# Patient Record
Sex: FEMALE | Race: WHITE | URBAN - METROPOLITAN AREA
[De-identification: names, ages, dates, MRNs, and addresses within clinical notes are randomized per-mention and may not be internally consistent; named-entity substitution may affect disease eponyms.]

---

## 2022-07-21 ENCOUNTER — OFFICE VISIT (OUTPATIENT)
Dept: URBAN - METROPOLITAN AREA CLINIC 19 | Facility: CLINIC | Age: 72
End: 2022-07-21

## 2022-07-22 ENCOUNTER — WEB ENCOUNTER (OUTPATIENT)
Dept: URBAN - METROPOLITAN AREA CLINIC 19 | Facility: CLINIC | Age: 72
End: 2022-07-22

## 2022-07-28 ENCOUNTER — OFFICE VISIT (OUTPATIENT)
Dept: URBAN - METROPOLITAN AREA CLINIC 19 | Facility: CLINIC | Age: 72
End: 2022-07-28
Payer: COMMERCIAL

## 2022-07-28 ENCOUNTER — LAB OUTSIDE AN ENCOUNTER (OUTPATIENT)
Dept: URBAN - METROPOLITAN AREA CLINIC 19 | Facility: CLINIC | Age: 72
End: 2022-07-28

## 2022-07-28 VITALS
OXYGEN SATURATION: 100 % | BODY MASS INDEX: 25.92 KG/M2 | WEIGHT: 155.6 LBS | HEIGHT: 65 IN | SYSTOLIC BLOOD PRESSURE: 121 MMHG | DIASTOLIC BLOOD PRESSURE: 70 MMHG | TEMPERATURE: 98.8 F | HEART RATE: 76 BPM

## 2022-07-28 DIAGNOSIS — R19.7 DIARRHEA OF PRESUMED INFECTIOUS ORIGIN: ICD-10-CM

## 2022-07-28 DIAGNOSIS — R19.4 CHANGE IN BOWEL HABIT: ICD-10-CM

## 2022-07-28 DIAGNOSIS — K57.31 DIVERTICULOSIS LARGE INTESTINE W/O PERFORATION OR ABSCESS W/BLEEDING: ICD-10-CM

## 2022-07-28 PROCEDURE — 99204 OFFICE O/P NEW MOD 45 MIN: CPT | Performed by: STUDENT IN AN ORGANIZED HEALTH CARE EDUCATION/TRAINING PROGRAM

## 2022-07-28 RX ORDER — LEVOTHYROXINE SODIUM 112 UG/1
1 TABLET IN THE MORNING ON AN EMPTY STOMACH TABLET ORAL ONCE A DAY
Status: ACTIVE | COMMUNITY

## 2022-07-28 RX ORDER — LOSARTAN POTASSIUM AND HYDROCHLOROTHIAZIDE 50; 12.5 MG/1; MG/1
1 TABLET TABLET, FILM COATED ORAL ONCE A DAY
Status: ACTIVE | COMMUNITY

## 2022-07-28 NOTE — HPI-TODAY'S VISIT:
The pt is a 73 yo F who presents for diarrhea that was a change in stool habits since May.  Loose stools with multiple bms and fecal incontinence and urgency.  Blood work normal.  Planned stool studies but never done by PCP.  Triggerd by foods she has eaten.  Last colonoscopy was in 2011 by Dr. Lizzie Bazzi with a 3 mm descending colon.  She also had diverticulosis.  Had her thyroid doses increased due to being underdose recently. Very leary of invasive procedures after her  had a complication following surgery which resulted in kidney failure and receiving 18 units of blood transfusion.  She is currently his care provider and has been in contact with [employer].  Not on OAC/APT.  Not on supplements.  No sick contacts.  No travel.  No known FH of crc or colon polyps  Mom had diverticulosis.  No melena or bleeding.

## 2022-08-03 LAB — CLOSTRIDIUM DIFFICILE TOXINB,QL REAL TIME PCR: NOT DETECTED

## 2022-08-26 ENCOUNTER — CLAIMS CREATED FROM THE CLAIM WINDOW (OUTPATIENT)
Dept: URBAN - METROPOLITAN AREA CLINIC 4 | Facility: CLINIC | Age: 72
End: 2022-08-26
Payer: COMMERCIAL

## 2022-08-26 ENCOUNTER — OFFICE VISIT (OUTPATIENT)
Dept: URBAN - METROPOLITAN AREA SURGERY CENTER 31 | Facility: SURGERY CENTER | Age: 72
End: 2022-08-26
Payer: COMMERCIAL

## 2022-08-26 DIAGNOSIS — R19.4 ALTERATION IN BOWEL ELIMINATION: ICD-10-CM

## 2022-08-26 DIAGNOSIS — K62.89 HYPERTROPHY, ANAL PAPILLAE: ICD-10-CM

## 2022-08-26 DIAGNOSIS — K63.89 OTHER SPECIFIED DISEASES OF INTESTINE: ICD-10-CM

## 2022-08-26 DIAGNOSIS — K57.31 DIVERTICULOSIS LARGE INTESTINE W/O PERFORATION OR ABSCESS W/BLEEDING: ICD-10-CM

## 2022-08-26 PROCEDURE — 88305 TISSUE EXAM BY PATHOLOGIST: CPT | Performed by: PATHOLOGY

## 2022-08-26 PROCEDURE — G8907 PT DOC NO EVENTS ON DISCHARG: HCPCS | Performed by: STUDENT IN AN ORGANIZED HEALTH CARE EDUCATION/TRAINING PROGRAM

## 2022-08-26 PROCEDURE — 45380 COLONOSCOPY AND BIOPSY: CPT | Performed by: STUDENT IN AN ORGANIZED HEALTH CARE EDUCATION/TRAINING PROGRAM

## 2022-08-26 RX ORDER — LOSARTAN POTASSIUM AND HYDROCHLOROTHIAZIDE 50; 12.5 MG/1; MG/1
1 TABLET TABLET, FILM COATED ORAL ONCE A DAY
Status: ACTIVE | COMMUNITY

## 2022-08-26 RX ORDER — LEVOTHYROXINE SODIUM 112 UG/1
1 TABLET IN THE MORNING ON AN EMPTY STOMACH TABLET ORAL ONCE A DAY
Status: ACTIVE | COMMUNITY

## 2022-09-07 PROBLEM — 1086601000119100: Status: ACTIVE | Noted: 2022-07-28

## 2022-09-08 ENCOUNTER — WEB ENCOUNTER (OUTPATIENT)
Dept: URBAN - METROPOLITAN AREA CLINIC 19 | Facility: CLINIC | Age: 72
End: 2022-09-08

## 2022-10-05 ENCOUNTER — TELEPHONE ENCOUNTER (OUTPATIENT)
Dept: URBAN - METROPOLITAN AREA SURGERY CENTER 30 | Facility: SURGERY CENTER | Age: 72
End: 2022-10-05

## 2022-10-05 ENCOUNTER — TELEPHONE ENCOUNTER (OUTPATIENT)
Dept: URBAN - METROPOLITAN AREA CLINIC 19 | Facility: CLINIC | Age: 72
End: 2022-10-05

## 2022-10-27 ENCOUNTER — WEB ENCOUNTER (OUTPATIENT)
Dept: URBAN - METROPOLITAN AREA CLINIC 19 | Facility: CLINIC | Age: 72
End: 2022-10-27

## 2022-11-30 ENCOUNTER — DASHBOARD ENCOUNTERS (OUTPATIENT)
Age: 72
End: 2022-11-30

## 2022-11-30 ENCOUNTER — OFFICE VISIT (OUTPATIENT)
Dept: URBAN - METROPOLITAN AREA CLINIC 19 | Facility: CLINIC | Age: 72
End: 2022-11-30
Payer: COMMERCIAL

## 2022-11-30 ENCOUNTER — WEB ENCOUNTER (OUTPATIENT)
Dept: URBAN - METROPOLITAN AREA CLINIC 19 | Facility: CLINIC | Age: 72
End: 2022-11-30

## 2022-11-30 VITALS
SYSTOLIC BLOOD PRESSURE: 130 MMHG | HEART RATE: 50 BPM | BODY MASS INDEX: 27.49 KG/M2 | HEIGHT: 65 IN | TEMPERATURE: 97.4 F | DIASTOLIC BLOOD PRESSURE: 76 MMHG | WEIGHT: 165 LBS

## 2022-11-30 DIAGNOSIS — K58.9 IBS (IRRITABLE BOWEL SYNDROME): ICD-10-CM

## 2022-11-30 DIAGNOSIS — R15.2 FECAL URGENCY: ICD-10-CM

## 2022-11-30 DIAGNOSIS — K59.1 FUNCTIONAL DIARRHEA: ICD-10-CM

## 2022-11-30 DIAGNOSIS — R15.9 FULL INCONTINENCE OF FECES: ICD-10-CM

## 2022-11-30 PROBLEM — 47812002: Status: ACTIVE | Noted: 2022-11-30

## 2022-11-30 PROBLEM — 71820002: Status: ACTIVE | Noted: 2022-11-30

## 2022-11-30 PROBLEM — 10743008 IRRITABLE BOWEL SYNDROME: Status: ACTIVE | Noted: 2022-11-30

## 2022-11-30 PROBLEM — 142251000119102: Status: ACTIVE | Noted: 2022-11-30

## 2022-11-30 PROCEDURE — 99213 OFFICE O/P EST LOW 20 MIN: CPT | Performed by: STUDENT IN AN ORGANIZED HEALTH CARE EDUCATION/TRAINING PROGRAM

## 2022-11-30 RX ORDER — LOSARTAN POTASSIUM AND HYDROCHLOROTHIAZIDE 50; 12.5 MG/1; MG/1
1 TABLET TABLET, FILM COATED ORAL ONCE A DAY
Status: ACTIVE | COMMUNITY

## 2022-11-30 RX ORDER — LEVOTHYROXINE SODIUM 112 UG/1
1 TABLET IN THE MORNING ON AN EMPTY STOMACH TABLET ORAL ONCE A DAY
Status: ACTIVE | COMMUNITY

## 2022-11-30 NOTE — HPI-TODAY'S VISIT:
11/30/2022:  Lexi: The patient is here for a procedure follow up. Has taken fiberol daily with coffee.  Still has pretty bad   8/26/2022:  Lexi - Colonoscopy - Diverticulosis in the sigmoid and descending colon.  Otherwise no abnormalities.  7/22/2022:  Lexi: The pt is a 73 yo F who presents for diarrhea that was a change in stool habits since May.  Loose stools with multiple bms and fecal incontinence and urgency.  Blood work normal.  Planned stool studies but never done by PCP.  Triggerd by foods she has eaten.  Last colonoscopy was in 2011 by Dr. Lizzie Bazzi with a 3 mm descending colon.  She also had diverticulosis.  Had her thyroid doses increased due to being underdose recently. Very leary of invasive procedures after her  had a complication following surgery which resulted in kidney failure and receiving 18 units of blood transfusion.  She is currently his care provider and has been in contact with grandkids.  Not on OAC/APT.  Not on supplements.  No sick contacts.  No travel.  No known FH of crc or colon polyps  Mom had diverticulosis.  No melena or bleeding.

## 2023-02-02 ENCOUNTER — OFFICE VISIT (OUTPATIENT)
Dept: URBAN - METROPOLITAN AREA CLINIC 19 | Facility: CLINIC | Age: 73
End: 2023-02-02